# Patient Record
Sex: MALE | Race: OTHER | URBAN - METROPOLITAN AREA
[De-identification: names, ages, dates, MRNs, and addresses within clinical notes are randomized per-mention and may not be internally consistent; named-entity substitution may affect disease eponyms.]

---

## 2023-01-14 ENCOUNTER — EMERGENCY (EMERGENCY)
Facility: HOSPITAL | Age: 38
LOS: 1 days | Discharge: HOME | End: 2023-01-14
Attending: STUDENT IN AN ORGANIZED HEALTH CARE EDUCATION/TRAINING PROGRAM | Admitting: STUDENT IN AN ORGANIZED HEALTH CARE EDUCATION/TRAINING PROGRAM
Payer: MEDICAID

## 2023-01-14 VITALS
RESPIRATION RATE: 18 BRPM | SYSTOLIC BLOOD PRESSURE: 124 MMHG | HEART RATE: 65 BPM | TEMPERATURE: 97 F | DIASTOLIC BLOOD PRESSURE: 70 MMHG | OXYGEN SATURATION: 100 %

## 2023-01-14 PROCEDURE — 99284 EMERGENCY DEPT VISIT MOD MDM: CPT

## 2023-01-14 NOTE — ED PROVIDER NOTE - PHYSICAL EXAMINATION
Afebrile, well appearing, rrr, ctabl, abdomen soft, no le edema, stable gait  No midline c spine ttp or stepoffs. 5/5 strength in all four extremities  +spurlings test  2+ radial pulses on the L, wwp.

## 2023-01-14 NOTE — ED PROVIDER NOTE - PATIENT PORTAL LINK FT
You can access the FollowMyHealth Patient Portal offered by [facility] by registering at the following website: [email or web address]. By joining [employer]’s FollowMyHealth portal, you will also be able to view your health information using other applications (apps) compatible with our system.

## 2023-01-14 NOTE — ED PROVIDER NOTE - OBJECTIVE STATEMENT
37M, no sig pmh, who presents with L sided neck pain. For the past few days, reports positional, radiating L sided neck pain that goes down the lateral aspect of LUE. No trauma. Not on blood thinners. No speech/visual difficulties. Tolerating PO. Stooling/voiding without any issues. Works in manual labor. No skin changes. No headaches, fevers, chills, cough, sputum, cp, sob, belly pain, nvd, dysuria, hematuria, recent travel, trauma, syncope.     #: [id number]

## 2023-01-14 NOTE — ED PROVIDER NOTE - CLINICAL SUMMARY MEDICAL DECISION MAKING FREE TEXT BOX
37M, no sig pmh, who presents with atraumatic L sided neck pain. AFebrile, well appearing. No neuro deficits. +spurlings test. Instructed on analgesia. No hx of trauma. No infectious sxs. Will DC with PMD follow-up. Possible cervicalgia.

## 2023-01-14 NOTE — ED PROVIDER NOTE - NSFOLLOWUPINSTRUCTIONS_ED_ALL_ED_FT
TAKE TYLENOL 650mg EVERY 4 HOURS AS NEEDED FOR MILD PAIN  TAKE MOTRIN 600mg EVERY 6 HOURS AS NEEDED FOR MODERATE PAIN    FOLLOW UP WITH YOUR PRIMARY CARE PROVIDER WITHIN 1 WEEK.    RETURN TO THE EMERGENCY DEPARTMENT FOR ANY WORSENING SYMPTOMS.

## 2023-04-02 ENCOUNTER — EMERGENCY (EMERGENCY)
Facility: HOSPITAL | Age: 38
LOS: 0 days | Discharge: HOME | End: 2023-04-03
Attending: STUDENT IN AN ORGANIZED HEALTH CARE EDUCATION/TRAINING PROGRAM
Payer: MEDICAID

## 2023-04-02 VITALS
DIASTOLIC BLOOD PRESSURE: 89 MMHG | TEMPERATURE: 99 F | OXYGEN SATURATION: 100 % | RESPIRATION RATE: 17 BRPM | HEIGHT: 64 IN | SYSTOLIC BLOOD PRESSURE: 137 MMHG | HEART RATE: 95 BPM | WEIGHT: 238.1 LBS

## 2023-04-02 DIAGNOSIS — I10 ESSENTIAL (PRIMARY) HYPERTENSION: ICD-10-CM

## 2023-04-02 DIAGNOSIS — R42 DIZZINESS AND GIDDINESS: ICD-10-CM

## 2023-04-02 DIAGNOSIS — R07.89 OTHER CHEST PAIN: ICD-10-CM

## 2023-04-02 PROCEDURE — 99285 EMERGENCY DEPT VISIT HI MDM: CPT

## 2023-04-02 PROCEDURE — 93010 ELECTROCARDIOGRAM REPORT: CPT

## 2023-04-02 RX ORDER — HYDROCHLOROTHIAZIDE 25 MG
0 TABLET ORAL
Refills: 0 | DISCHARGE

## 2023-04-02 NOTE — ED PROVIDER NOTE - PATIENT PORTAL LINK FT
You can access the FollowMyHealth Patient Portal offered by [facility] by registering at the following website: [email or web address]. By joining [employer]’s FollowMyHealth portal, you will also be able to view your health information using other applications (apps) compatible with our system.

## 2023-04-02 NOTE — ED PROVIDER NOTE - NSFOLLOWUPINSTRUCTIONS_ED_ALL_ED_FT
Alyx un seguimiento con hoffman médico de atención primaria en 24 a 48 horas: tome copias de adama resultados.    Regrese al Departamento de Emergencias si los síntomas empeoran o persisten, y/o CUALQUIER SÍNTOMA NUEVO O PREOCUPANTE, incluido dolor en el pecho con náuseas/vómitos/sudores, dificultad para respirar. Si tiene problemas para obtener un seguimiento, llame al: [phone] ([id number]) para obtener un médico o especialista que acepte hoffman seguro en hoffman área.    Es probable que tenga ERGE (enfermedad por reflujo gastroesofágico). Considere el seguimiento con un gastroenterólogo para los síntomas persistentes.    Considere justin Pepcid y/o Maalox (que están disponibles sin receta) en hoffman farmacia para los síntomas.

## 2023-04-02 NOTE — ED PROVIDER NOTE - OBJECTIVE STATEMENT
37-year-old male past medical history of hypertension presents with burning chest pain x2 days.  Patient states that pain started after eating a meal yesterday.  Described as burning radiating up his chest lasting for a short time, intermittent, currently has no pain.  No associated nausea, vomiting, shortness of breath.  Feels a little bit lightheaded when it occurs. Pain is nonradiating.  Never had similar pain before.  No family history of MI. Non-smoker.  Went to an urgent care and was told that his EKG was normal but was sent to the ER for blood work.     ID#[id number] (Danish, Astrid) used.

## 2023-04-02 NOTE — ED PROVIDER NOTE - PROGRESS NOTE DETAILS
DO Osman – Pt was re-evaluated at bedside, continues to be asymptomatic, no CP. Labs/cxr not actionable. Results were discussed with patient as well as return precautions and follow up plan with PCP and/or specialist. Time was taken to answer any questions that the patient had before providing them with discharge paperwork. DO Osman – Pt was re-evaluated at bedside, continues to be asymptomatic, no CP. Labs/cxr not actionable. Results were discussed with patient as well as return precautions and follow up plan with PCP and/or specialist. Time was taken to answer any questions that the patient had before providing them with discharge paperwork. Used  ID [id number] Anurag/Greek.

## 2023-04-02 NOTE — ED PROVIDER NOTE - NSFOLLOWUPCLINICS_GEN_ALL_ED_FT
Gastroenterology at [facility]  Gastroenterology  [address] [place] [address]  Phone: [phone]  Fax:     [facility]  Gastroenterology  [address] [place] [address]  Phone: [phone]  Fax:     [facility]  Gastroenterology  [address]  [place] [address]  Phone: [phone]  Fax:

## 2023-04-02 NOTE — ED ADULT NURSE NOTE - NSIMPLEMENTINTERV_GEN_ALL_ED
Implemented All Universal Safety Interventions:  [place] to call system. Call bell, personal items and telephone within reach. Instruct patient to call for assistance. Room bathroom lighting operational. Non-slip footwear when patient is off stretcher. Physically safe environment: no spills, clutter or unnecessary equipment. Stretcher in lowest position, wheels locked, appropriate side rails in place.

## 2023-04-02 NOTE — ED PROVIDER NOTE - CLINICAL SUMMARY MEDICAL DECISION MAKING FREE TEXT BOX
Patient with history as above presented with intermittent burning CP x 2 days . History obtained from patient.  EKG reviewed. NSR no diagnostic ischemic changes  Labs reviewed.   Independently reviewed imaging.     Differential diagnosis considered. Overall presentation is consistent with GERD, gastritis. Low risk for ACS, PE, aortic dissection.  Patient asymptomatic, and do not suspect cardiac etiology but pt requested bloodwork.    Disposition: Discussed need to follow up diagnostics, including incidental findings. Discharged with instructions to obtain outpatient follow up of patient’s symptoms and findings, with strict return precautions if patient develops new or worsening symptoms.

## 2023-04-02 NOTE — ED PROVIDER NOTE - PHYSICAL EXAMINATION
I have reviewed the triage vital signs.  Const: AAOx3, in NAD  Eyes: no conjunctival injection  HENT: NCAT, Neck supple, oral mucosa moist  CV: RRR, +S1, S2  Resp: CTAB, no respiratory distress  GI: Abdomen soft, NTND, no guarding  : no CVA tenderness  Extremities: No peripheral edema,  2+ radial and DP pulses  Skin: Warm, well perfused, no rash  MSK: No gross deformities appreciated  Neuro: No focal sensory or motor deficits  Psych: Appropriate mood and affect

## 2023-04-02 NOTE — ED ADULT NURSE NOTE - OBJECTIVE STATEMENT
patient is a&ox4 presenting with epigastric pain that radiates to mid chest for past x2 days. Patient went to urgent care for evaluation and was sent to ED for further evaluation. Patient describes pain as burning, rating 3/10 when pain is present, but denies current pain. Patient states taking omeprazole at 730PM and finding relief of symptoms. Patient notes he has been having increased burping and moments of dizziness. Patient denies any current chest pain, SOB, n, v, diarrhea, constipation, numbness/tingling, trauma/injury. Patient placed on cardiac monitor upon assessment. PMH HTN.

## 2023-04-03 VITALS
SYSTOLIC BLOOD PRESSURE: 117 MMHG | HEART RATE: 57 BPM | TEMPERATURE: 98 F | OXYGEN SATURATION: 97 % | RESPIRATION RATE: 16 BRPM | DIASTOLIC BLOOD PRESSURE: 82 MMHG

## 2023-04-03 LAB
ALBUMIN SERPL ELPH-MCNC: 3.9 G/DL — SIGNIFICANT CHANGE UP (ref 3.3–5)
ALP SERPL-CCNC: 59 U/L — SIGNIFICANT CHANGE UP (ref 40–120)
ALT FLD-CCNC: 33 U/L — SIGNIFICANT CHANGE UP (ref 12–78)
ANION GAP SERPL CALC-SCNC: 4 MMOL/L — LOW (ref 5–17)
AST SERPL-CCNC: 16 U/L — SIGNIFICANT CHANGE UP (ref 15–37)
BASOPHILS # BLD AUTO: 0.08 K/UL — SIGNIFICANT CHANGE UP (ref 0–0.2)
BASOPHILS NFR BLD AUTO: 0.7 % — SIGNIFICANT CHANGE UP (ref 0–2)
BILIRUB SERPL-MCNC: 0.4 MG/DL — SIGNIFICANT CHANGE UP (ref 0.2–1.2)
BUN SERPL-MCNC: 20 MG/DL — SIGNIFICANT CHANGE UP (ref 7–23)
CALCIUM SERPL-MCNC: 9.7 MG/DL — SIGNIFICANT CHANGE UP (ref 8.5–10.1)
CHLORIDE SERPL-SCNC: 103 MMOL/L — SIGNIFICANT CHANGE UP (ref 96–108)
CO2 SERPL-SCNC: 28 MMOL/L — SIGNIFICANT CHANGE UP (ref 22–31)
CREAT SERPL-MCNC: 1.13 MG/DL — SIGNIFICANT CHANGE UP (ref 0.5–1.3)
EGFR: 86 ML/MIN/1.73M2 — SIGNIFICANT CHANGE UP
EOSINOPHIL # BLD AUTO: 0.18 K/UL — SIGNIFICANT CHANGE UP (ref 0–0.5)
EOSINOPHIL NFR BLD AUTO: 1.7 % — SIGNIFICANT CHANGE UP (ref 0–6)
GLUCOSE SERPL-MCNC: 112 MG/DL — HIGH (ref 70–99)
HCT VFR BLD CALC: 45.5 % — SIGNIFICANT CHANGE UP (ref 39–50)
HGB BLD-MCNC: 15.3 G/DL — SIGNIFICANT CHANGE UP (ref 13–17)
IMM GRANULOCYTES NFR BLD AUTO: 0.3 % — SIGNIFICANT CHANGE UP (ref 0–0.9)
LIDOCAIN IGE QN: 72 U/L — LOW (ref 73–393)
LYMPHOCYTES # BLD AUTO: 28 % — SIGNIFICANT CHANGE UP (ref 13–44)
LYMPHOCYTES # BLD AUTO: 3.03 K/UL — SIGNIFICANT CHANGE UP (ref 1–3.3)
MCHC RBC-ENTMCNC: 29.7 PG — SIGNIFICANT CHANGE UP (ref 27–34)
MCHC RBC-ENTMCNC: 33.6 G/DL — SIGNIFICANT CHANGE UP (ref 32–36)
MCV RBC AUTO: 88.3 FL — SIGNIFICANT CHANGE UP (ref 80–100)
MONOCYTES # BLD AUTO: 0.62 K/UL — SIGNIFICANT CHANGE UP (ref 0–0.9)
MONOCYTES NFR BLD AUTO: 5.7 % — SIGNIFICANT CHANGE UP (ref 2–14)
NEUTROPHILS # BLD AUTO: 6.89 K/UL — SIGNIFICANT CHANGE UP (ref 1.8–7.4)
NEUTROPHILS NFR BLD AUTO: 63.6 % — SIGNIFICANT CHANGE UP (ref 43–77)
NRBC # BLD: 0 /100 WBCS — SIGNIFICANT CHANGE UP (ref 0–0)
PLATELET # BLD AUTO: 176 K/UL — SIGNIFICANT CHANGE UP (ref 150–400)
POTASSIUM SERPL-MCNC: 3.7 MMOL/L — SIGNIFICANT CHANGE UP (ref 3.5–5.3)
POTASSIUM SERPL-SCNC: 3.7 MMOL/L — SIGNIFICANT CHANGE UP (ref 3.5–5.3)
PROT SERPL-MCNC: 8 GM/DL — SIGNIFICANT CHANGE UP (ref 6–8.3)
RBC # BLD: 5.15 M/UL — SIGNIFICANT CHANGE UP (ref 4.2–5.8)
RBC # FLD: 13.5 % — SIGNIFICANT CHANGE UP (ref 10.3–14.5)
SODIUM SERPL-SCNC: 135 MMOL/L — SIGNIFICANT CHANGE UP (ref 135–145)
TROPONIN I, HIGH SENSITIVITY RESULT: 5.5 NG/L — SIGNIFICANT CHANGE UP
WBC # BLD: 10.83 K/UL — HIGH (ref 3.8–10.5)
WBC # FLD AUTO: 10.83 K/UL — HIGH (ref 3.8–10.5)

## 2023-04-03 PROCEDURE — 71046 X-RAY EXAM CHEST 2 VIEWS: CPT | Mod: 26

## 2023-05-21 ENCOUNTER — EMERGENCY (EMERGENCY)
Facility: HOSPITAL | Age: 38
LOS: 0 days | Discharge: HOME | End: 2023-05-21
Attending: EMERGENCY MEDICINE
Payer: MEDICAID

## 2023-05-21 VITALS
TEMPERATURE: 99 F | RESPIRATION RATE: 18 BRPM | HEART RATE: 75 BPM | OXYGEN SATURATION: 99 % | WEIGHT: 238.1 LBS | SYSTOLIC BLOOD PRESSURE: 138 MMHG | HEIGHT: 67 IN | DIASTOLIC BLOOD PRESSURE: 86 MMHG

## 2023-05-21 DIAGNOSIS — R42 DIZZINESS AND GIDDINESS: ICD-10-CM

## 2023-05-21 DIAGNOSIS — I10 ESSENTIAL (PRIMARY) HYPERTENSION: ICD-10-CM

## 2023-05-21 DIAGNOSIS — R20.2 PARESTHESIA OF SKIN: ICD-10-CM

## 2023-05-21 DIAGNOSIS — R00.2 PALPITATIONS: ICD-10-CM

## 2023-05-21 DIAGNOSIS — M54.2 CERVICALGIA: ICD-10-CM

## 2023-05-21 LAB
ALBUMIN SERPL ELPH-MCNC: 4 G/DL — SIGNIFICANT CHANGE UP (ref 3.3–5)
ALP SERPL-CCNC: 56 U/L — SIGNIFICANT CHANGE UP (ref 40–120)
ALT FLD-CCNC: 37 U/L — SIGNIFICANT CHANGE UP (ref 12–78)
ANION GAP SERPL CALC-SCNC: 4 MMOL/L — LOW (ref 5–17)
AST SERPL-CCNC: 14 U/L — LOW (ref 15–37)
BASOPHILS # BLD AUTO: 0.07 K/UL — SIGNIFICANT CHANGE UP (ref 0–0.2)
BASOPHILS NFR BLD AUTO: 0.6 % — SIGNIFICANT CHANGE UP (ref 0–2)
BILIRUB SERPL-MCNC: 0.3 MG/DL — SIGNIFICANT CHANGE UP (ref 0.2–1.2)
BUN SERPL-MCNC: 16 MG/DL — SIGNIFICANT CHANGE UP (ref 7–23)
CALCIUM SERPL-MCNC: 9.2 MG/DL — SIGNIFICANT CHANGE UP (ref 8.5–10.1)
CHLORIDE SERPL-SCNC: 104 MMOL/L — SIGNIFICANT CHANGE UP (ref 96–108)
CO2 SERPL-SCNC: 29 MMOL/L — SIGNIFICANT CHANGE UP (ref 22–31)
CREAT SERPL-MCNC: 1.03 MG/DL — SIGNIFICANT CHANGE UP (ref 0.5–1.3)
EGFR: 96 ML/MIN/1.73M2 — SIGNIFICANT CHANGE UP
EOSINOPHIL # BLD AUTO: 0.06 K/UL — SIGNIFICANT CHANGE UP (ref 0–0.5)
EOSINOPHIL NFR BLD AUTO: 0.5 % — SIGNIFICANT CHANGE UP (ref 0–6)
GLUCOSE SERPL-MCNC: 96 MG/DL — SIGNIFICANT CHANGE UP (ref 70–99)
HCT VFR BLD CALC: 45.5 % — SIGNIFICANT CHANGE UP (ref 39–50)
HGB BLD-MCNC: 15.1 G/DL — SIGNIFICANT CHANGE UP (ref 13–17)
IMM GRANULOCYTES NFR BLD AUTO: 0.3 % — SIGNIFICANT CHANGE UP (ref 0–0.9)
LYMPHOCYTES # BLD AUTO: 27.1 % — SIGNIFICANT CHANGE UP (ref 13–44)
LYMPHOCYTES # BLD AUTO: 3.38 K/UL — HIGH (ref 1–3.3)
MCHC RBC-ENTMCNC: 29.7 PG — SIGNIFICANT CHANGE UP (ref 27–34)
MCHC RBC-ENTMCNC: 33.2 G/DL — SIGNIFICANT CHANGE UP (ref 32–36)
MCV RBC AUTO: 89.4 FL — SIGNIFICANT CHANGE UP (ref 80–100)
MONOCYTES # BLD AUTO: 0.45 K/UL — SIGNIFICANT CHANGE UP (ref 0–0.9)
MONOCYTES NFR BLD AUTO: 3.6 % — SIGNIFICANT CHANGE UP (ref 2–14)
NEUTROPHILS # BLD AUTO: 8.46 K/UL — HIGH (ref 1.8–7.4)
NEUTROPHILS NFR BLD AUTO: 67.9 % — SIGNIFICANT CHANGE UP (ref 43–77)
NRBC # BLD: 0 /100 WBCS — SIGNIFICANT CHANGE UP (ref 0–0)
PLATELET # BLD AUTO: 225 K/UL — SIGNIFICANT CHANGE UP (ref 150–400)
POTASSIUM SERPL-MCNC: 4 MMOL/L — SIGNIFICANT CHANGE UP (ref 3.5–5.3)
POTASSIUM SERPL-SCNC: 4 MMOL/L — SIGNIFICANT CHANGE UP (ref 3.5–5.3)
PROT SERPL-MCNC: 8.1 GM/DL — SIGNIFICANT CHANGE UP (ref 6–8.3)
RBC # BLD: 5.09 M/UL — SIGNIFICANT CHANGE UP (ref 4.2–5.8)
RBC # FLD: 14.1 % — SIGNIFICANT CHANGE UP (ref 10.3–14.5)
SODIUM SERPL-SCNC: 137 MMOL/L — SIGNIFICANT CHANGE UP (ref 135–145)
TROPONIN I, HIGH SENSITIVITY RESULT: 4.2 NG/L — SIGNIFICANT CHANGE UP
WBC # BLD: 12.46 K/UL — HIGH (ref 3.8–10.5)
WBC # FLD AUTO: 12.46 K/UL — HIGH (ref 3.8–10.5)

## 2023-05-21 PROCEDURE — 71046 X-RAY EXAM CHEST 2 VIEWS: CPT | Mod: 26

## 2023-05-21 PROCEDURE — 99285 EMERGENCY DEPT VISIT HI MDM: CPT

## 2023-05-21 PROCEDURE — 70450 CT HEAD/BRAIN W/O DYE: CPT | Mod: 26,MA

## 2023-05-21 RX ORDER — SODIUM CHLORIDE 9 MG/ML
1000 INJECTION INTRAMUSCULAR; INTRAVENOUS; SUBCUTANEOUS ONCE
Refills: 0 | Status: COMPLETED | OUTPATIENT
Start: 2023-05-21 | End: 2023-05-21

## 2023-05-21 RX ADMIN — SODIUM CHLORIDE 1000 MILLILITER(S): 9 INJECTION INTRAMUSCULAR; INTRAVENOUS; SUBCUTANEOUS at 19:06

## 2023-05-21 NOTE — ED ADULT NURSE NOTE - OBJECTIVE STATEMENT
Pt presents to the ED AOx3 and calm complaining of ongoing palpitations, dizziness and slight numbing of the left arm that comes and goes. States that this has been occuring for a couple weeks and went to another ER in [place] (doesn't remember which hospital) for the same reason and was d/c without dx.  Pt denies any N/V or SOB. No facial droop or slurred speech or unilateral weakness noted.  equal on both hands.  pt ambulatory with steady gait   Pt has history of HTN

## 2023-05-21 NOTE — ED PROVIDER NOTE - CLINICAL SUMMARY MEDICAL DECISION MAKING FREE TEXT BOX
37-year-old male history of hypertension presents for 15 to 20 minutes of chest palpitations accompanied by dizziness described as a lightheaded sensation.  Patient states that was about 2 hours prior to arrival.  Had similar episode 4 days ago and worked up and released from a [facility] (unknown which).  Patient states he did not have a head CT at that time.  Will do head CT, labs, EKG, CXR, IVF, likely outpatient f/u.  For arm tingling, likely 2/2 cervical radiculopathy based on exam.  Neuro exam otherwise grossly unremarkable.

## 2023-05-21 NOTE — ED PROVIDER NOTE - PHYSICAL EXAMINATION
GENERAL: nontoxic appearing, NAD.   EYES: Conjunctiva noninjected or pale, sclera anicteric  HENT: NC/AT, moist mucous membranes  NECK: Supple, trachea midline  LUNG: Nonlabored respirations, no wheezes, rales  CV: RRR, Pulses- Radial: 2+ bilateral and equal  ABDOMEN: Nondistended, nontender  MSK: No visible deformities, nontender extremities  SKIN: No rashes, bruises  NEURO/HEAD:  Head is normocephalic and atraumatic. No head tenderness or skull depression on palpation. No temporal cord-like sensation, increased warmth or tenderness. Pt is alert and oriented x 3, able to follow commands. No facial asymmetry. Pupils 5 mm equally round with PERRL, no nystagmus, EOM intact. Cranial nerves III-XII grossly intact. Coordination nose-to-finger intact. All limbs equally strong bilaterally 5/5. No pronator drift. No spinal/paraspinal tenderness. Neck mildly tender to left lateral / shoulder aspect but otherwise supple with full range of motion. No nuchal rigidity.  Tandem gait intact.   PSYCH: calm and cooperative.  No evidence of hallucinations. No apparent risk to self or others.

## 2023-05-21 NOTE — ED ADULT NURSE REASSESSMENT NOTE - NS ED NURSE REASSESS COMMENT FT1
received er chair h7 from previous rn denies dizziness or palpitations at present ns infusing without difficulty no pain at present dispo pending

## 2023-05-21 NOTE — ED PROVIDER NOTE - PATIENT PORTAL LINK FT
You can access the FollowMyHealth Patient Portal offered by [facility] by registering at the following website: [email or web address]. By joining [employer]’s FollowMyHealth portal, you will also be able to view your health information using other applications (apps) compatible with our system.

## 2023-05-21 NOTE — ED ADULT TRIAGE NOTE - CHIEF COMPLAINT QUOTE
pt states while walking, started having palpitations and chest discomfort, then started to feel dizzy, accompanied by left arm numbness and blurry vision x 1 hr ago. complaints of dizziness for days. pt seen in ED x 1 week ago for similar symptoms. hx: htn, hyperlipidemia.

## 2023-05-21 NOTE — ED PROVIDER NOTE - PROGRESS NOTE DETAILS
Patient reports feeling better, no longer has tingling in his arm.  Per patient's wife, patient has pcp appointment for tomorrow and will attempt to get cardiology appointment sooner.  Patient nontoxic and medically stable for discharge. Results discussed with patient. Return precautions provided and patient understands to return to the ED for concerning or worsening signs and symptoms. Patient's questions answered.

## 2023-05-21 NOTE — ED PROVIDER NOTE - OBJECTIVE STATEMENT
37-year-old male history of hypertension presents for 15 to 20 minutes of chest palpitations accompanied by dizziness described as a lightheaded sensation.  Patient states that was about 2 hours prior to arrival.  Patient states that he had similar episode 4 days ago when she was evaluated at a [facility] for including EKG labs and provide IV fluids and was discharged to follow-up with a cardiologist.  Patient states that since he had the episode on Wednesday he felt a tingling sensation in his left arm which has been persistent.  Patient denies any other neuro deficits.  Patient states other than the arm tingling currently he has no pain.  Patient states the arm tingling is associated with posterior left sided neck pain that is worse with movement.  Patient denies any infectious symptoms including any fevers chills or recent illness.  Patient denies any chest pain at any point denies any shortness of breath at any point.  Denies any other symptoms.  Denies any recreational drug or etoh use.

## 2023-05-21 NOTE — ED PROVIDER NOTE - NSFOLLOWUPINSTRUCTIONS_ED_ALL_ED_FT
Dizziness    Dizziness can manifest as a feeling of unsteadiness or light-headedness. You may feel like you are about to faint. This condition can be caused by a number of things, including medicines, dehydration, or illness. Drink enough fluid to keep your urine clear or pale yellow. Do not drink alcohol and limit your caffeine intake. Avoid quick or sudden movements.  Rise slowly from chairs and steady yourself until you feel okay. In the morning, first sit up on the side of the bed.    SEEK IMMEDIATE MEDICAL CARE IF YOU HAVE ANY OF THE FOLLOWING SYMPTOMS: vomiting, changes in your vision or speech, weakness in your arms or legs, trouble speaking or swallowing, chest pain, abdominal pain, shortness of breath, sweating, bleeding, headache, neck pain, or fever.     Palpitations    A palpitation is the feeling that your heartbeat is irregular or is faster than normal. It may feel like your heart is fluttering or skipping a beat. They may be caused by many things, including smoking, caffeine, alcohol, stress, and certain medicines. Although most causes of palpitations are not serious, palpitations can be a sign of a serious medical problem. Avoid caffeine, alcohol, and tobacco products at home. Try to reduce stress and anxiety and make sure to get plenty of rest.     SEEK IMMEDIATE MEDICAL CARE IF YOU HAVE ANY OF THE FOLLOWING SYMPTOMS: chest pain, shortness of breath, severe headache, dizziness/lightheadedness, or fainting.     Please call your primary doctor to inform them of this ER visit and obtain the next available appointment within the next 5 days. As we discussed, return to the ER if you have any worsening symptoms. - Bring copies of your results.    We no longer feel that you need further emergency care or admission to the hospital at this time.    While we have determined that you are currently stable for discharge, we know that things can change. Please seek immediate medical attention or return to the ER if you experience any of the following:  Any worsening or persistent symptoms  Severe Pain  Chest Pain  Difficulty Breathing  Bleeding  Passing Out  Severe Rash  Inability to Eat or Drink  Persistent Fever    Please see a primary care doctor or specialist within 5 days to ensure that you are improving.    Please call the [facility] phone numbers on this document if you have any problems obtaining a follow up appointment.

## 2023-06-02 PROBLEM — I10 ESSENTIAL (PRIMARY) HYPERTENSION: Chronic | Status: ACTIVE | Noted: 2023-04-02

## 2023-06-02 RX ORDER — HYDROCHLOROTHIAZIDE 25 MG
1 TABLET ORAL
Refills: 0 | DISCHARGE

## 2025-07-07 NOTE — ED PROVIDER NOTE - IV ALTEPLASE INCLUSION HIDDEN
show
[email or web address]